# Patient Record
Sex: FEMALE | Race: ASIAN | NOT HISPANIC OR LATINO | URBAN - METROPOLITAN AREA
[De-identification: names, ages, dates, MRNs, and addresses within clinical notes are randomized per-mention and may not be internally consistent; named-entity substitution may affect disease eponyms.]

---

## 2022-11-04 VITALS
RESPIRATION RATE: 18 BRPM | HEART RATE: 81 BPM | DIASTOLIC BLOOD PRESSURE: 95 MMHG | SYSTOLIC BLOOD PRESSURE: 158 MMHG | OXYGEN SATURATION: 99 % | TEMPERATURE: 98 F

## 2022-11-04 PROCEDURE — 99285 EMERGENCY DEPT VISIT HI MDM: CPT

## 2022-11-04 PROCEDURE — 99053 MED SERV 10PM-8AM 24 HR FAC: CPT

## 2022-11-04 NOTE — ED ADULT TRIAGE NOTE - ARRIVAL INFO ADDITIONAL COMMENTS
Pt to ED c/o Rectal bleeding. Pt reports 2 episodes of bloody stool "Bright red and a lot of blood". Pt had colonoscope this Morning at 08:00. Pt reports removal of 3 polyps. Hx of Diverticulosis. Denies N/V, visual changes, nor dizziness.

## 2022-11-05 ENCOUNTER — INPATIENT (INPATIENT)
Facility: HOSPITAL | Age: 67
LOS: 1 days | Discharge: HOME | DRG: 920 | End: 2022-11-07
Attending: STUDENT IN AN ORGANIZED HEALTH CARE EDUCATION/TRAINING PROGRAM | Admitting: STUDENT IN AN ORGANIZED HEALTH CARE EDUCATION/TRAINING PROGRAM
Payer: COMMERCIAL

## 2022-11-05 LAB
ALBUMIN SERPL ELPH-MCNC: 4.5 G/DL — SIGNIFICANT CHANGE UP (ref 3.3–5)
ALP SERPL-CCNC: 81 U/L — SIGNIFICANT CHANGE UP (ref 40–120)
ALT FLD-CCNC: 11 U/L — SIGNIFICANT CHANGE UP (ref 10–45)
ANION GAP SERPL CALC-SCNC: 8 MMOL/L — SIGNIFICANT CHANGE UP (ref 5–17)
APTT BLD: 33.7 SEC — SIGNIFICANT CHANGE UP (ref 27.5–35.5)
AST SERPL-CCNC: 20 U/L — SIGNIFICANT CHANGE UP (ref 10–40)
BASOPHILS # BLD AUTO: 0.08 K/UL — SIGNIFICANT CHANGE UP (ref 0–0.2)
BASOPHILS NFR BLD AUTO: 1.5 % — SIGNIFICANT CHANGE UP (ref 0–2)
BILIRUB SERPL-MCNC: 0.2 MG/DL — SIGNIFICANT CHANGE UP (ref 0.2–1.2)
BLD GP AB SCN SERPL QL: NEGATIVE — SIGNIFICANT CHANGE UP
BLD GP AB SCN SERPL QL: NEGATIVE — SIGNIFICANT CHANGE UP
BUN SERPL-MCNC: 20 MG/DL — SIGNIFICANT CHANGE UP (ref 7–23)
CALCIUM SERPL-MCNC: 8.8 MG/DL — SIGNIFICANT CHANGE UP (ref 8.4–10.5)
CHLORIDE SERPL-SCNC: 104 MMOL/L — SIGNIFICANT CHANGE UP (ref 96–108)
CO2 SERPL-SCNC: 28 MMOL/L — SIGNIFICANT CHANGE UP (ref 22–31)
CREAT SERPL-MCNC: 0.86 MG/DL — SIGNIFICANT CHANGE UP (ref 0.5–1.3)
EGFR: 74 ML/MIN/1.73M2 — SIGNIFICANT CHANGE UP
EOSINOPHIL # BLD AUTO: 0.15 K/UL — SIGNIFICANT CHANGE UP (ref 0–0.5)
EOSINOPHIL NFR BLD AUTO: 2.7 % — SIGNIFICANT CHANGE UP (ref 0–6)
GLUCOSE BLDC GLUCOMTR-MCNC: 123 MG/DL — HIGH (ref 70–99)
GLUCOSE BLDC GLUCOMTR-MCNC: 140 MG/DL — HIGH (ref 70–99)
GLUCOSE SERPL-MCNC: 141 MG/DL — HIGH (ref 70–99)
HCT VFR BLD CALC: 21.6 % — LOW (ref 34.5–45)
HCT VFR BLD CALC: 26.1 % — LOW (ref 34.5–45)
HCT VFR BLD CALC: 26.8 % — LOW (ref 34.5–45)
HCT VFR BLD CALC: 32.1 % — LOW (ref 34.5–45)
HCT VFR BLD CALC: 39 % — SIGNIFICANT CHANGE UP (ref 34.5–45)
HGB BLD-MCNC: 10.2 G/DL — LOW (ref 11.5–15.5)
HGB BLD-MCNC: 12.7 G/DL — SIGNIFICANT CHANGE UP (ref 11.5–15.5)
HGB BLD-MCNC: 7.2 G/DL — LOW (ref 11.5–15.5)
HGB BLD-MCNC: 8.8 G/DL — LOW (ref 11.5–15.5)
HGB BLD-MCNC: 8.8 G/DL — LOW (ref 11.5–15.5)
IMM GRANULOCYTES NFR BLD AUTO: 0.2 % — SIGNIFICANT CHANGE UP (ref 0–0.9)
INR BLD: 0.99 — SIGNIFICANT CHANGE UP (ref 0.88–1.16)
LACTATE SERPL-SCNC: 1.1 MMOL/L — SIGNIFICANT CHANGE UP (ref 0.5–2)
LIDOCAIN IGE QN: 40 U/L — SIGNIFICANT CHANGE UP (ref 7–60)
LYMPHOCYTES # BLD AUTO: 1.57 K/UL — SIGNIFICANT CHANGE UP (ref 1–3.3)
LYMPHOCYTES # BLD AUTO: 28.5 % — SIGNIFICANT CHANGE UP (ref 13–44)
MAGNESIUM SERPL-MCNC: 2.2 MG/DL — SIGNIFICANT CHANGE UP (ref 1.6–2.6)
MCHC RBC-ENTMCNC: 30 PG — SIGNIFICANT CHANGE UP (ref 27–34)
MCHC RBC-ENTMCNC: 30.5 PG — SIGNIFICANT CHANGE UP (ref 27–34)
MCHC RBC-ENTMCNC: 30.5 PG — SIGNIFICANT CHANGE UP (ref 27–34)
MCHC RBC-ENTMCNC: 30.9 PG — SIGNIFICANT CHANGE UP (ref 27–34)
MCHC RBC-ENTMCNC: 31 PG — SIGNIFICANT CHANGE UP (ref 27–34)
MCHC RBC-ENTMCNC: 31.8 GM/DL — LOW (ref 32–36)
MCHC RBC-ENTMCNC: 32.6 GM/DL — SIGNIFICANT CHANGE UP (ref 32–36)
MCHC RBC-ENTMCNC: 32.8 GM/DL — SIGNIFICANT CHANGE UP (ref 32–36)
MCHC RBC-ENTMCNC: 33.3 GM/DL — SIGNIFICANT CHANGE UP (ref 32–36)
MCHC RBC-ENTMCNC: 33.7 GM/DL — SIGNIFICANT CHANGE UP (ref 32–36)
MCV RBC AUTO: 91.5 FL — SIGNIFICANT CHANGE UP (ref 80–100)
MCV RBC AUTO: 91.6 FL — SIGNIFICANT CHANGE UP (ref 80–100)
MCV RBC AUTO: 93.8 FL — SIGNIFICANT CHANGE UP (ref 80–100)
MCV RBC AUTO: 94.4 FL — SIGNIFICANT CHANGE UP (ref 80–100)
MCV RBC AUTO: 94.4 FL — SIGNIFICANT CHANGE UP (ref 80–100)
MONOCYTES # BLD AUTO: 0.41 K/UL — SIGNIFICANT CHANGE UP (ref 0–0.9)
MONOCYTES NFR BLD AUTO: 7.5 % — SIGNIFICANT CHANGE UP (ref 2–14)
NEUTROPHILS # BLD AUTO: 3.28 K/UL — SIGNIFICANT CHANGE UP (ref 1.8–7.4)
NEUTROPHILS NFR BLD AUTO: 59.6 % — SIGNIFICANT CHANGE UP (ref 43–77)
NRBC # BLD: 0 /100 WBCS — SIGNIFICANT CHANGE UP (ref 0–0)
PHOSPHATE SERPL-MCNC: 3.6 MG/DL — SIGNIFICANT CHANGE UP (ref 2.5–4.5)
PLATELET # BLD AUTO: 158 K/UL — SIGNIFICANT CHANGE UP (ref 150–400)
PLATELET # BLD AUTO: 174 K/UL — SIGNIFICANT CHANGE UP (ref 150–400)
PLATELET # BLD AUTO: 202 K/UL — SIGNIFICANT CHANGE UP (ref 150–400)
PLATELET # BLD AUTO: 203 K/UL — SIGNIFICANT CHANGE UP (ref 150–400)
PLATELET # BLD AUTO: 245 K/UL — SIGNIFICANT CHANGE UP (ref 150–400)
POTASSIUM SERPL-MCNC: 4.1 MMOL/L — SIGNIFICANT CHANGE UP (ref 3.5–5.3)
POTASSIUM SERPL-SCNC: 4.1 MMOL/L — SIGNIFICANT CHANGE UP (ref 3.5–5.3)
PROT SERPL-MCNC: 6.9 G/DL — SIGNIFICANT CHANGE UP (ref 6–8.3)
PROTHROM AB SERPL-ACNC: 11.8 SEC — SIGNIFICANT CHANGE UP (ref 10.5–13.4)
RBC # BLD: 2.36 M/UL — LOW (ref 3.8–5.2)
RBC # BLD: 2.84 M/UL — LOW (ref 3.8–5.2)
RBC # BLD: 2.85 M/UL — LOW (ref 3.8–5.2)
RBC # BLD: 3.4 M/UL — LOW (ref 3.8–5.2)
RBC # BLD: 4.16 M/UL — SIGNIFICANT CHANGE UP (ref 3.8–5.2)
RBC # FLD: 12.1 % — SIGNIFICANT CHANGE UP (ref 10.3–14.5)
RBC # FLD: 12.1 % — SIGNIFICANT CHANGE UP (ref 10.3–14.5)
RBC # FLD: 12.3 % — SIGNIFICANT CHANGE UP (ref 10.3–14.5)
RBC # FLD: 12.4 % — SIGNIFICANT CHANGE UP (ref 10.3–14.5)
RBC # FLD: 12.4 % — SIGNIFICANT CHANGE UP (ref 10.3–14.5)
RH IG SCN BLD-IMP: POSITIVE — SIGNIFICANT CHANGE UP
RH IG SCN BLD-IMP: POSITIVE — SIGNIFICANT CHANGE UP
SARS-COV-2 RNA SPEC QL NAA+PROBE: NEGATIVE — SIGNIFICANT CHANGE UP
SODIUM SERPL-SCNC: 140 MMOL/L — SIGNIFICANT CHANGE UP (ref 135–145)
WBC # BLD: 5.5 K/UL — SIGNIFICANT CHANGE UP (ref 3.8–10.5)
WBC # BLD: 5.76 K/UL — SIGNIFICANT CHANGE UP (ref 3.8–10.5)
WBC # BLD: 5.91 K/UL — SIGNIFICANT CHANGE UP (ref 3.8–10.5)
WBC # BLD: 6.55 K/UL — SIGNIFICANT CHANGE UP (ref 3.8–10.5)
WBC # BLD: 7.26 K/UL — SIGNIFICANT CHANGE UP (ref 3.8–10.5)
WBC # FLD AUTO: 5.5 K/UL — SIGNIFICANT CHANGE UP (ref 3.8–10.5)
WBC # FLD AUTO: 5.76 K/UL — SIGNIFICANT CHANGE UP (ref 3.8–10.5)
WBC # FLD AUTO: 5.91 K/UL — SIGNIFICANT CHANGE UP (ref 3.8–10.5)
WBC # FLD AUTO: 6.55 K/UL — SIGNIFICANT CHANGE UP (ref 3.8–10.5)
WBC # FLD AUTO: 7.26 K/UL — SIGNIFICANT CHANGE UP (ref 3.8–10.5)

## 2022-11-05 PROCEDURE — 99291 CRITICAL CARE FIRST HOUR: CPT | Mod: GC

## 2022-11-05 PROCEDURE — 99254 IP/OBS CNSLTJ NEW/EST MOD 60: CPT | Mod: 25

## 2022-11-05 PROCEDURE — 45382 COLONOSCOPY W/CONTROL BLEED: CPT

## 2022-11-05 PROCEDURE — 74174 CTA ABD&PLVS W/CONTRAST: CPT | Mod: 26,MA

## 2022-11-05 PROCEDURE — 99252 IP/OBS CONSLTJ NEW/EST SF 35: CPT | Mod: GC

## 2022-11-05 RX ORDER — DEXTROSE 50 % IN WATER 50 %
15 SYRINGE (ML) INTRAVENOUS ONCE
Refills: 0 | Status: DISCONTINUED | OUTPATIENT
Start: 2022-11-05 | End: 2022-11-06

## 2022-11-05 RX ORDER — PANTOPRAZOLE SODIUM 20 MG/1
40 TABLET, DELAYED RELEASE ORAL DAILY
Refills: 0 | Status: DISCONTINUED | OUTPATIENT
Start: 2022-11-05 | End: 2022-11-07

## 2022-11-05 RX ORDER — SODIUM CHLORIDE 9 MG/ML
1000 INJECTION, SOLUTION INTRAVENOUS
Refills: 0 | Status: DISCONTINUED | OUTPATIENT
Start: 2022-11-05 | End: 2022-11-06

## 2022-11-05 RX ORDER — DEXTROSE 50 % IN WATER 50 %
25 SYRINGE (ML) INTRAVENOUS ONCE
Refills: 0 | Status: DISCONTINUED | OUTPATIENT
Start: 2022-11-05 | End: 2022-11-06

## 2022-11-05 RX ORDER — DEXTROSE 50 % IN WATER 50 %
12.5 SYRINGE (ML) INTRAVENOUS ONCE
Refills: 0 | Status: DISCONTINUED | OUTPATIENT
Start: 2022-11-05 | End: 2022-11-06

## 2022-11-05 RX ORDER — INSULIN LISPRO 100/ML
VIAL (ML) SUBCUTANEOUS EVERY 6 HOURS
Refills: 0 | Status: DISCONTINUED | OUTPATIENT
Start: 2022-11-05 | End: 2022-11-06

## 2022-11-05 RX ORDER — SODIUM CHLORIDE 9 MG/ML
1000 INJECTION, SOLUTION INTRAVENOUS ONCE
Refills: 0 | Status: COMPLETED | OUTPATIENT
Start: 2022-11-05 | End: 2022-11-05

## 2022-11-05 RX ORDER — EPINEPHRINE 0.3 MG/.3ML
0.1 INJECTION INTRAMUSCULAR; SUBCUTANEOUS ONCE
Refills: 0 | Status: DISCONTINUED | OUTPATIENT
Start: 2022-11-05 | End: 2022-11-05

## 2022-11-05 RX ORDER — CHLORHEXIDINE GLUCONATE 213 G/1000ML
1 SOLUTION TOPICAL
Refills: 0 | Status: DISCONTINUED | OUTPATIENT
Start: 2022-11-05 | End: 2022-11-07

## 2022-11-05 RX ORDER — MIDAZOLAM HYDROCHLORIDE 1 MG/ML
4 INJECTION, SOLUTION INTRAMUSCULAR; INTRAVENOUS ONCE
Refills: 0 | Status: DISCONTINUED | OUTPATIENT
Start: 2022-11-05 | End: 2022-11-05

## 2022-11-05 RX ORDER — INFLUENZA VIRUS VACCINE 15; 15; 15; 15 UG/.5ML; UG/.5ML; UG/.5ML; UG/.5ML
0.7 SUSPENSION INTRAMUSCULAR ONCE
Refills: 0 | Status: DISCONTINUED | OUTPATIENT
Start: 2022-11-05 | End: 2022-11-07

## 2022-11-05 RX ORDER — SOD SULF/SODIUM/NAHCO3/KCL/PEG
2000 SOLUTION, RECONSTITUTED, ORAL ORAL ONCE
Refills: 0 | Status: COMPLETED | OUTPATIENT
Start: 2022-11-05 | End: 2022-11-05

## 2022-11-05 RX ORDER — FENTANYL CITRATE 50 UG/ML
100 INJECTION INTRAVENOUS ONCE
Refills: 0 | Status: DISCONTINUED | OUTPATIENT
Start: 2022-11-05 | End: 2022-11-05

## 2022-11-05 RX ORDER — GLUCAGON INJECTION, SOLUTION 0.5 MG/.1ML
1 INJECTION, SOLUTION SUBCUTANEOUS ONCE
Refills: 0 | Status: DISCONTINUED | OUTPATIENT
Start: 2022-11-05 | End: 2022-11-06

## 2022-11-05 RX ADMIN — SODIUM CHLORIDE 100 MILLILITER(S): 9 INJECTION, SOLUTION INTRAVENOUS at 09:14

## 2022-11-05 RX ADMIN — FENTANYL CITRATE 100 MICROGRAM(S): 50 INJECTION INTRAVENOUS at 14:15

## 2022-11-05 RX ADMIN — PANTOPRAZOLE SODIUM 40 MILLIGRAM(S): 20 TABLET, DELAYED RELEASE ORAL at 12:22

## 2022-11-05 RX ADMIN — SODIUM CHLORIDE 1000 MILLILITER(S): 9 INJECTION, SOLUTION INTRAVENOUS at 06:00

## 2022-11-05 RX ADMIN — FENTANYL CITRATE 100 MICROGRAM(S): 50 INJECTION INTRAVENOUS at 15:00

## 2022-11-05 RX ADMIN — MIDAZOLAM HYDROCHLORIDE 4 MILLIGRAM(S): 1 INJECTION, SOLUTION INTRAMUSCULAR; INTRAVENOUS at 14:40

## 2022-11-05 RX ADMIN — Medication 2000 MILLILITER(S): at 09:14

## 2022-11-05 RX ADMIN — SODIUM CHLORIDE 100 MILLILITER(S): 9 INJECTION, SOLUTION INTRAVENOUS at 17:14

## 2022-11-05 NOTE — H&P ADULT - HISTORY OF PRESENT ILLNESS
66yo female with no sig PMH/PSH POD0 from screning colonoscopy with 3 popypectomies who presents with 4 hour history of BRBPR. Patient underwent colonoscopy at [facility] this morning at 9am where 3 polyps were removed (2 cecal, 1 decending), following procedure went home to sleep. At 2230 had 3-4 episodes of BRBPR, described as mixed with stool, however in pictures provided no stool identified. Blood no associated with any abdominal pain, rectal pain. Denies dizziness, chest pain, dyspnea, nausea, vomiting. Prior to this previous colonoscopy was "many years ago."    PMH: None  PSH: none  Meds: none  All: none  Social: non smoker, occasional etoh  Fam: no personal or family hx of IBD or CRC  Functional capacity: >4METS    In the ED:  -afebrile, non tachycardic, not hypotensive  - Orthostatic VS  Lying BP: 137/85 HR: 76  Sitting BP: 151/87 HR: 82  Standing BP: 121/89 HR: 97  - Hgb 10.2 from 12.7  - CTA prelim read with active extravasation from cecum

## 2022-11-05 NOTE — ED ADULT NURSE NOTE - OBJECTIVE STATEMENT
Pt arrived to ED AAOX4, spont unlab breathing on RA, NAD. PT had colonoscopy early this AM and is now experiencing rectal bleeding. Pt had 3 BM since 2200 and reports large amount of bright red blood with some clots. Pt denies abd pain but states it feels like her abd is gurgling. Pt denies fevers. Had 3 polyps removed during colonoscopy. Denies lightheadedness, SOB, CP or dizziness. Will continue to assess.

## 2022-11-05 NOTE — ED PROVIDER NOTE - NS ED ROS FT
Constitutional: No fever or chills.   Eyes: No pain, blurry vision, or discharge.  ENMT: No hearing changes, pain, discharge or infections. No neck pain or stiffness.  Cardiac: No chest pain, SOB or edema. No chest pain with exertion.  Respiratory: No cough or respiratory distress. No hemoptysis. No history of asthma or RAD.  GI: No nausea, vomiting, diarrhea. See HPI  : No dysuria, frequency or burning.  MS: No myalgia, muscle weakness, joint pain or back pain.  Neuro: No headache or weakness. No LOC.  Skin: No skin rash.   Endocrine: No history of thyroid disease or diabetes.  Except as documented in the HPI, all other systems are negative.

## 2022-11-05 NOTE — ED PROVIDER NOTE - OBJECTIVE STATEMENT
Pt w/ no sig PMHx, no PSHx p/w BRBPR since 10pm tonight. She states she had screening c-scope at 8 am this morning, returned home at 10am. She states she had polyps removed. She presents the report with the following impressions: "Benign neoplasm of cecum, benign neoplasm of desc colon, small internal hemorrhoids, and pandiverticulosis." At 10pm, she felt the need to have a BM. She went to the bathroom and passed red blood into the toilet w/ small clots. She has since had 2 subsequent episodes w/o BMs. No abd pain. No rectal pain. She has never had EGD. Last c-scope 17 years ago, wnl. No AC use. No antiplatelet use.

## 2022-11-05 NOTE — CHART NOTE - NSCHARTNOTEFT_GEN_A_CORE
Colonoscopy performed in ICU at bedside      Impressions:  The site of prior cecal polypectomy was identified - appeared ulcerated and with blood clot attached. The site was not actively bleeding, but was likely the source of her bleeding. Two hemoclips were placed with good approximation of tissue. Post clip observation did not reveal active bleeding, and the ulcerated region was no longer visible. .    Diverticulosis of the whole colon.    Plan:	  Return to floor for further management  Clear Liquid Diet  Trend HgB    Prabhjot Garza M.D.  Gastroenterology Fellow  Weekday Pager: [phone]  Weeknights/Weekend Coverage: Please Call the  for contact info

## 2022-11-05 NOTE — H&P ADULT - NSHPPHYSICALEXAM_GEN_ALL_CORE
Physical Exam:  General: NAD, resting comfortably in bed  Pulmonary: Nonlabored, no respiratory distress  Cardiovascular: regular rate and rhythm   Abdominal: soft, mild LLQ tenderness no rebound  TAYLOR (chaperoned by female RN: blood in vault, good rectal tone, no massess  Extremities: WWP, normal strength  Neuro: A/O x 3, no focal deficits, normal motor/sensation  Pulses: palpable distal pulses

## 2022-11-05 NOTE — CONSULT NOTE ADULT - ATTENDING COMMENTS
67yoF, underwent screening colonoscopy with 2 polyps removed from the cecum and 1 from the descending colon 11/4/22, presented with BRBPR  CTA pos for active extravasation in the cecum.   Suspect postpolypectomy bleeding.       Agree with colonoscopy with bleeding control today at ICU bedside.
68 yo F admitted for GIB after recent 3 polypectomies done on screening colonoscopy. CTa with active extravasation in the cecum. plan for bedside colonoscopy today. c/w ppi, monitor hgb and transfuse as needed.

## 2022-11-05 NOTE — PROCEDURAL SAFETY CHECKLIST WITH OR WITHOUT SEDATION - NSDXIMAGING_GEN_ALL_CORE
not applicable
[Breast Pain] : no breast pain
[Nipple Discharge] : no nipple discharge
[Breast Lump] : no breast lump
done
[Negative] : Constitutional
[Nipple Inverted] : no inversion of the nipple

## 2022-11-05 NOTE — CONSULT NOTE ADULT - SUBJECTIVE AND OBJECTIVE BOX
GASTROENTEROLOGY CONSULT NOTE  HPI:  66 yo F, underwent screening colonoscopy with 3 polypectomies 11/4/22, pw BRBPR,   3 polyps were removed (2 cecal, 1 descending) unclear what method - I called outpt GI without success.  Patient ate dinner, went to sleep, woke up to 3-4 episodes of painless BRBPR,   Prior colon was 17 years ago, reported "clean"  Has Fhx of CRC  Recall was 3 years on patient dc instructions shown to me  Patient finishing 1.5L of prep when I was in room, still passing blood per rectum    Allergies    Allergy Status Unknown    Intolerances      Home Medications:    MEDICATIONS:  MEDICATIONS  (STANDING):  chlorhexidine 4% Liquid 1 Application(s) Topical <User Schedule>  dextrose 5%. 1000 milliLiter(s) (50 mL/Hr) IV Continuous <Continuous>  dextrose 5%. 1000 milliLiter(s) (100 mL/Hr) IV Continuous <Continuous>  dextrose 50% Injectable 25 Gram(s) IV Push once  dextrose 50% Injectable 12.5 Gram(s) IV Push once  dextrose 50% Injectable 25 Gram(s) IV Push once  glucagon  Injectable 1 milliGRAM(s) IntraMuscular once  influenza  Vaccine (HIGH DOSE) 0.7 milliLiter(s) IntraMuscular once  insulin lispro (ADMELOG) corrective regimen sliding scale   SubCutaneous every 6 hours  lactated ringers. 1000 milliLiter(s) (100 mL/Hr) IV Continuous <Continuous>  pantoprazole  Injectable 40 milliGRAM(s) IV Push daily    MEDICATIONS  (PRN):  dextrose Oral Gel 15 Gram(s) Oral once PRN Blood Glucose LESS THAN 70 milliGRAM(s)/deciliter    PAST MEDICAL & SURGICAL HISTORY:  No pertinent past medical history  denies ac or antiplts        FAMILY HISTORY:  CRC      SOCIAL HISTORY:  Tobacco: denies  Alcohol: eduardo  Illicit Drugs: denies    REVIEW OF SYSTEMS:  All other 10 review of systems is negative unless indicated above.    Vital Signs Last 24 Hrs  T(C): 36.3 (05 Nov 2022 09:23), Max: 36.8 (04 Nov 2022 23:05)  T(F): 97.4 (05 Nov 2022 09:23), Max: 98.2 (04 Nov 2022 23:05)  HR: 85 (05 Nov 2022 11:00) (79 - 103)  BP: 102/67 (05 Nov 2022 11:00) (99/63 - 158/95)  BP(mean): 80 (05 Nov 2022 11:00) (75 - 80)  RR: 13 (05 Nov 2022 11:00) (12 - 28)  SpO2: 100% (05 Nov 2022 11:00) (97% - 100%)    Parameters below as of 05 Nov 2022 11:00  Patient On (Oxygen Delivery Method): room air        11-05 @ 08:01  -  11-05 @ 11:47  --------------------------------------------------------  IN: 2300 mL / OUT: 0 mL / NET: 2300 mL        PHYSICAL EXAM:    General: lying in bed, in no acute distress  HEENT: Neck supple, mmm, no jvd  Lungs: Normal respiratory effort, no intercostal retractions  Cardiovascular: regular rate  Abdomen: Soft, non-tender non-distended; No rebound or guarding  Extremities: wwp, no cce  Neurological: THURMAN, speech fluent  Skin: Warm and dry. No obvious rash    LABS:                        8.8    6.55  )-----------( 202      ( 05 Nov 2022 10:20 )             26.8     11-05    140  |  104  |  20  ----------------------------<  141<H>  4.1   |  28  |  0.86    Ca    8.8      05 Nov 2022 00:35  Phos  3.6     11-05  Mg     2.2     11-05    TPro  6.9  /  Alb  4.5  /  TBili  0.2  /  DBili  x   /  AST  20  /  ALT  11  /  AlkPhos  81  11-05        PT/INR - ( 05 Nov 2022 00:35 )   PT: 11.8 sec;   INR: 0.99          PTT - ( 05 Nov 2022 00:35 )  PTT:33.7 sec    RADIOLOGY & ADDITIONAL STUDIES:     Reviewed

## 2022-11-05 NOTE — PROCEDURAL SAFETY CHECKLIST WITH OR WITHOUT SEDATION - NSSPECIALEQUIPSUPPLY_GEN_ALL_CORE
Impression: Macular degeneration, wet OS. Status: Symptomatic.
s/p Lucentis x 37  last 07/06/2021
s/p Avastin x 27  last 02/16/2021 Plan: The patient notes metamorphopsia. Exam and OCT reveal scant IRF and a PED OS. An IVFA from 08/31/2021 demonstrated leakage. Fundus Photos from 08/31/2021 demonstrated drusen. Recommend Lucentis R/B/A discussed with patient, and the patient elects to proceed with Lucentis (bilateral). Discussed AREDS / I Vit and Amsler grid.
done
done

## 2022-11-05 NOTE — ED PROVIDER NOTE - CLINICAL SUMMARY MEDICAL DECISION MAKING FREE TEXT BOX
Pt p/w lower GI bleeding s/p c-scope, likely from bx site. HDS. No heavy bleeding on exam. Orthostatics wnl. Will monitor in ED and get serial H/H. Consider CTA if status changes. Dispo pending w/u and clinical status

## 2022-11-05 NOTE — PATIENT PROFILE ADULT - FALL HARM RISK - RISK INTERVENTIONS

## 2022-11-05 NOTE — ED PROVIDER NOTE - PROGRESS NOTE DETAILS
Sig drop in H/H. VS wnl. Pt reports 2 further episodes. Repeat rectal exam, no active bleeding from rectum, but will red blood in rectum. Will get CTA bleeding scan. Surgery consulted and will see the pt + active bleeding visualized at cecum polypectomy site, reviewed by surgery, d/w rads. Admit to [facility]. Pt likely for c-scope or IR procedure this mornign + active bleeding visualized at cecum polypectomy site, reviewed by surgery, d/w rads. Admit to [facility]. Pt likely for c-scope or IR procedure this morning

## 2022-11-05 NOTE — CONSULT NOTE ADULT - ASSESSMENT
67F with no significant pmh or psh who presents to [facility] following screening colonoscopy w/ polypectomy x3 with 4 hr h/o BRBPR. Passed 3-4 episodes of hematochezia otherwise denies any associated symptoms including abdominal pain, n/v, lightheadedness, changes in vision, palpitations, or rectal pain. In the ED vitals were orthostatic (as written above), otherwise nontacchycardic or hypotensive. Hgb 10.2, Cr. 0.86, lactate 1.1. CTA A/P notable for active extravasation from cecum. SICU consulted for hemodynamic monitoring with anticipated cscope with GI.     Neuro: AOx4. Pain control PRN  CV: MAP>65, trend H/H  Pulm: Sats well on RA  GI/FEN: LGIB- 2/2 cecal polypectomy. NPO, IVF, PPI. GI consulted- tentative cscope today.   : voids  Endo: mISS. BG goal <180  Heme: ABLA 2/2 LGIB. q6h CBC- 12.7>10.2.   PPX: SCD/Hold SQH in setting of LGIB  PT/OT: Not Ordered  Dispo: SICU  
68 yo F, underwent screening colonoscopy with 3 polypectomies 11/4/22, pw BRBPR,     GI was consulted for GI bleeding    #BRBPR  #Acute blood loss anemia, in setting of recent polypectomy    Recommendations:  Keep NPO post prep  Bedside Colonoscopy today  Trend HgB    Thank you for the courtesy of this consult. We will follow along with you.    [facility]OM[facility] ED, Surgical Team and Attending, GI Attending, RN    Prabhjot Garza M.D.  Gastroenterology Fellow  Weekday Pager: [phone]  Weeknights/Weekend Coverage: Please Call the  for contact info

## 2022-11-05 NOTE — ED PROVIDER NOTE - PHYSICAL EXAMINATION
Constitutional: Well appearing, awake, alert, oriented to person, place, time/situation and in no apparent distress.  ENMT: Airway patent. Normal MM  Eyes: Clear bilaterally. no conjunctival pallor  Cardiac: Normal rate, regular rhythm.  Heart sounds S1, S2.  No murmurs, rubs or gallops.  Respiratory: Breaths sounds equal and clear b/l. No increased WOB, tachypnea, hypoxia, or accessory mm use. Pt speaks in full sentences.   Gastrointestinal: Abd soft, NT, ND, NABS. No guarding, rebound, or rigidity. No pulsatile abdominal masses.   Rectal: no active bleeding. + dark red blood in rectum   Musculoskeletal: Range of motion is not limited  Neuro: Alert and oriented x 3, face symmetric and speech fluent. Strength 5/5 x 4 ext and symmetric, nml gross motor movement, nml gait. No focal deficits noted.  Skin: Skin normal color for race, warm, dry and intact. No evidence of rash.  Psych: Alert and oriented to person, place, time/situation. normal mood and affect. no apparent risk to self or others.

## 2022-11-05 NOTE — H&P ADULT - ASSESSMENT
68yo female post procedure day 1 from screening colonoscopy with cecal and descending polypectomy presents with post procedural BRBPR. Afebrile, HDS, exam sig for LLQ tenderness and mild distention, Hgb downtrending while in the emergency department from 12.7 to 10.2. CTA prelim read demonstrating likely contrast extravasation in cecum, Impression is post polypectomy bleeding. Admit to SICU for HD monitoring      Plan:   Neuro: Pain control PRN   CV: MAP>65, trend H/H  Pulm: Sats well on RA  GI/FEN: NPO, IVF, PPI  : voids  Endo: mISS  Heme: CBCq2  PPX: SCD/Hold SQH  PT/OT: Not Ordered  Dispo: SICU

## 2022-11-06 LAB
A1C WITH ESTIMATED AVERAGE GLUCOSE RESULT: 5.7 % — HIGH (ref 4–5.6)
ANION GAP SERPL CALC-SCNC: 4 MMOL/L — LOW (ref 5–17)
BUN SERPL-MCNC: 9 MG/DL — SIGNIFICANT CHANGE UP (ref 7–23)
CALCIUM SERPL-MCNC: 7.8 MG/DL — LOW (ref 8.4–10.5)
CHLORIDE SERPL-SCNC: 112 MMOL/L — HIGH (ref 96–108)
CO2 SERPL-SCNC: 28 MMOL/L — SIGNIFICANT CHANGE UP (ref 22–31)
CREAT SERPL-MCNC: 0.71 MG/DL — SIGNIFICANT CHANGE UP (ref 0.5–1.3)
EGFR: 93 ML/MIN/1.73M2 — SIGNIFICANT CHANGE UP
ESTIMATED AVERAGE GLUCOSE: 117 MG/DL — HIGH (ref 68–114)
GLUCOSE BLDC GLUCOMTR-MCNC: 120 MG/DL — HIGH (ref 70–99)
GLUCOSE BLDC GLUCOMTR-MCNC: 122 MG/DL — HIGH (ref 70–99)
GLUCOSE BLDC GLUCOMTR-MCNC: 127 MG/DL — HIGH (ref 70–99)
GLUCOSE BLDC GLUCOMTR-MCNC: 88 MG/DL — SIGNIFICANT CHANGE UP (ref 70–99)
GLUCOSE SERPL-MCNC: 115 MG/DL — HIGH (ref 70–99)
HCT VFR BLD CALC: 22.4 % — LOW (ref 34.5–45)
HCT VFR BLD CALC: 22.5 % — LOW (ref 34.5–45)
HCT VFR BLD CALC: 25.2 % — LOW (ref 34.5–45)
HCV AB S/CO SERPL IA: 0.04 S/CO — SIGNIFICANT CHANGE UP
HCV AB SERPL-IMP: SIGNIFICANT CHANGE UP
HGB BLD-MCNC: 7.5 G/DL — LOW (ref 11.5–15.5)
HGB BLD-MCNC: 7.6 G/DL — LOW (ref 11.5–15.5)
HGB BLD-MCNC: 8.2 G/DL — LOW (ref 11.5–15.5)
MAGNESIUM SERPL-MCNC: 1.9 MG/DL — SIGNIFICANT CHANGE UP (ref 1.6–2.6)
MCHC RBC-ENTMCNC: 30.4 PG — SIGNIFICANT CHANGE UP (ref 27–34)
MCHC RBC-ENTMCNC: 31 PG — SIGNIFICANT CHANGE UP (ref 27–34)
MCHC RBC-ENTMCNC: 31.3 PG — SIGNIFICANT CHANGE UP (ref 27–34)
MCHC RBC-ENTMCNC: 32.5 GM/DL — SIGNIFICANT CHANGE UP (ref 32–36)
MCHC RBC-ENTMCNC: 33.3 GM/DL — SIGNIFICANT CHANGE UP (ref 32–36)
MCHC RBC-ENTMCNC: 33.9 GM/DL — SIGNIFICANT CHANGE UP (ref 32–36)
MCV RBC AUTO: 92.2 FL — SIGNIFICANT CHANGE UP (ref 80–100)
MCV RBC AUTO: 93 FL — SIGNIFICANT CHANGE UP (ref 80–100)
MCV RBC AUTO: 93.3 FL — SIGNIFICANT CHANGE UP (ref 80–100)
NRBC # BLD: 0 /100 WBCS — SIGNIFICANT CHANGE UP (ref 0–0)
PHOSPHATE SERPL-MCNC: 2.7 MG/DL — SIGNIFICANT CHANGE UP (ref 2.5–4.5)
PLATELET # BLD AUTO: 148 K/UL — LOW (ref 150–400)
PLATELET # BLD AUTO: 148 K/UL — LOW (ref 150–400)
PLATELET # BLD AUTO: 163 K/UL — SIGNIFICANT CHANGE UP (ref 150–400)
POTASSIUM SERPL-MCNC: 3.8 MMOL/L — SIGNIFICANT CHANGE UP (ref 3.5–5.3)
POTASSIUM SERPL-SCNC: 3.8 MMOL/L — SIGNIFICANT CHANGE UP (ref 3.5–5.3)
RBC # BLD: 2.42 M/UL — LOW (ref 3.8–5.2)
RBC # BLD: 2.43 M/UL — LOW (ref 3.8–5.2)
RBC # BLD: 2.7 M/UL — LOW (ref 3.8–5.2)
RBC # FLD: 12.7 % — SIGNIFICANT CHANGE UP (ref 10.3–14.5)
RBC # FLD: 13.2 % — SIGNIFICANT CHANGE UP (ref 10.3–14.5)
RBC # FLD: 13.2 % — SIGNIFICANT CHANGE UP (ref 10.3–14.5)
SODIUM SERPL-SCNC: 144 MMOL/L — SIGNIFICANT CHANGE UP (ref 135–145)
WBC # BLD: 5.15 K/UL — SIGNIFICANT CHANGE UP (ref 3.8–10.5)
WBC # BLD: 6.5 K/UL — SIGNIFICANT CHANGE UP (ref 3.8–10.5)
WBC # BLD: 7.78 K/UL — SIGNIFICANT CHANGE UP (ref 3.8–10.5)
WBC # FLD AUTO: 5.15 K/UL — SIGNIFICANT CHANGE UP (ref 3.8–10.5)
WBC # FLD AUTO: 6.5 K/UL — SIGNIFICANT CHANGE UP (ref 3.8–10.5)
WBC # FLD AUTO: 7.78 K/UL — SIGNIFICANT CHANGE UP (ref 3.8–10.5)

## 2022-11-06 PROCEDURE — 99233 SBSQ HOSP IP/OBS HIGH 50: CPT | Mod: GC

## 2022-11-06 PROCEDURE — 99232 SBSQ HOSP IP/OBS MODERATE 35: CPT | Mod: GC

## 2022-11-06 PROCEDURE — 99232 SBSQ HOSP IP/OBS MODERATE 35: CPT

## 2022-11-06 RX ORDER — POTASSIUM PHOSPHATE, MONOBASIC POTASSIUM PHOSPHATE, DIBASIC 236; 224 MG/ML; MG/ML
15 INJECTION, SOLUTION INTRAVENOUS ONCE
Refills: 0 | Status: COMPLETED | OUTPATIENT
Start: 2022-11-06 | End: 2022-11-06

## 2022-11-06 RX ORDER — SODIUM CHLORIDE 9 MG/ML
3 INJECTION INTRAMUSCULAR; INTRAVENOUS; SUBCUTANEOUS EVERY 8 HOURS
Refills: 0 | Status: DISCONTINUED | OUTPATIENT
Start: 2022-11-06 | End: 2022-11-07

## 2022-11-06 RX ADMIN — PANTOPRAZOLE SODIUM 40 MILLIGRAM(S): 20 TABLET, DELAYED RELEASE ORAL at 13:11

## 2022-11-06 RX ADMIN — POTASSIUM PHOSPHATE, MONOBASIC POTASSIUM PHOSPHATE, DIBASIC 62.5 MILLIMOLE(S): 236; 224 INJECTION, SOLUTION INTRAVENOUS at 09:36

## 2022-11-06 RX ADMIN — SODIUM CHLORIDE 100 MILLILITER(S): 9 INJECTION, SOLUTION INTRAVENOUS at 11:51

## 2022-11-06 RX ADMIN — SODIUM CHLORIDE 3 MILLILITER(S): 9 INJECTION INTRAMUSCULAR; INTRAVENOUS; SUBCUTANEOUS at 21:42

## 2022-11-06 RX ADMIN — CHLORHEXIDINE GLUCONATE 1 APPLICATION(S): 213 SOLUTION TOPICAL at 06:16

## 2022-11-06 NOTE — PROGRESS NOTE ADULT - ATTENDING COMMENTS
Agree with the assessment and plan documented in fellow's note.
admitted for gib. s/p colonoscopy yesterday which showed Diverticulosis, cecal polypectomy site with ulcer / blood clot, hemo-clipped x2. tolerating clear liquid this am, hd stable. transfer to floor.

## 2022-11-06 NOTE — PROGRESS NOTE ADULT - ASSESSMENT
68 yo F, underwent screening colonoscopy with 3 polypectomies 11/4/22, pw BRBPR,     GI was consulted for GI bleeding    Colonoscopy 11/5/2022: Diverticulosis, cecal polypectomy site with ulcer / blood clot, hemo-clipped x2    #BRBPR  #Acute blood loss anemia, in setting of recent polypectomy    Recommendations:  Advance Diet Today  Trend HgB  Consider IV Iron    Thank you for the courtesy of this consult. We will follow along with you.    Prabhjot Garza M.D.  Gastroenterology Fellow  Weekday Pager: [phone]  Weeknights/Weekend Coverage: Please Call the  for contact info 66 yo F, underwent screening colonoscopy with 3 polypectomies 11/4/22, pw BRBPR,     GI was consulted for GI bleeding    Colonoscopy 11/5/2022: Diverticulosis, cecal polypectomy site with ulcer / blood clot, hemo-clipped x2    #BRBPR  #Acute blood loss anemia, in setting of recent polypectomy    Recommendations:  Advance Diet Today  Trend HgB  Consider IV Iron  I discussed her care with her outpatient GI office covering physician    Thank you for the courtesy of this consult. We will follow along with you.    Prabhjot Garza M.D.  Gastroenterology Fellow  Weekday Pager: [phone]  Weeknights/Weekend Coverage: Please Call the  for contact info

## 2022-11-06 NOTE — PROGRESS NOTE ADULT - ASSESSMENT
68yo female post procedure day 1 from screening colonoscopy with cecal and descending polypectomy presents with post procedural BRBPR. Afebrile, HDS, exam sig for LLQ tenderness and mild distention, Hgb downtrending while in the emergency department from 12.7 to 10.2. CTA prelim read demonstrating likely contrast extravasation in cecum, likely polypectomy bleeding. s/p Cscope w no bleed but adherent clot and clipping x 2 (11/5).     Plan:   Neuro: Pain control PRN   CV: MAP>65, trend H/H  Pulm: Sats well on RA  GI/FEN:  Advanced to FLD, , PPI--recommend D/C  : voids  Endo: mISS  Heme: Monitor Hg. next CBC at 2pm  PPX: SCD/Hold SQH  T/L/D: PIV x2. Removed Meagan daly (11/5-6)  PT/OT: As tolerated  Dispo: SDU

## 2022-11-06 NOTE — PROGRESS NOTE ADULT - ASSESSMENT
PLAN PENDING ATTENDING  66yo female post procedure day 1 from screening colonoscopy with cecal and descending polypectomy presents with post procedural BRBPR. Afebrile, HDS, exam sig for LLQ tenderness and mild distention, Hgb downtrending while in the emergency department from 12.7 to 10.2. CTA prelim read demonstrating likely contrast extravasation in cecum, Impression is post polypectomy bleeding. Admit to SICU for HD monitoring      Plan:   -Pain/nausea control  -PPI  -Care per SICU   66yo female post procedure day 1 from screening colonoscopy with cecal and descending polypectomy presents with post procedural BRBPR. Afebrile, HDS, exam sig for LLQ tenderness and mild distention, Hgb downtrending while in the emergency department from 12.7 to 10.2. CTA prelim read demonstrating likely contrast extravasation in cecum, Impression is post polypectomy bleeding. Admit to SICU for HD monitoring      Plan:   -SDU  -FLD  -Pain/nausea control  -PPI  -Care per SICU

## 2022-11-07 VITALS — TEMPERATURE: 99 F

## 2022-11-07 LAB
ANION GAP SERPL CALC-SCNC: 8 MMOL/L — SIGNIFICANT CHANGE UP (ref 5–17)
BUN SERPL-MCNC: 4 MG/DL — LOW (ref 7–23)
CALCIUM SERPL-MCNC: 8.4 MG/DL — SIGNIFICANT CHANGE UP (ref 8.4–10.5)
CHLORIDE SERPL-SCNC: 108 MMOL/L — SIGNIFICANT CHANGE UP (ref 96–108)
CO2 SERPL-SCNC: 27 MMOL/L — SIGNIFICANT CHANGE UP (ref 22–31)
CREAT SERPL-MCNC: 0.73 MG/DL — SIGNIFICANT CHANGE UP (ref 0.5–1.3)
EGFR: 90 ML/MIN/1.73M2 — SIGNIFICANT CHANGE UP
GLUCOSE SERPL-MCNC: 129 MG/DL — HIGH (ref 70–99)
HCT VFR BLD CALC: 23.2 % — LOW (ref 34.5–45)
HGB BLD-MCNC: 7.6 G/DL — LOW (ref 11.5–15.5)
MAGNESIUM SERPL-MCNC: 1.9 MG/DL — SIGNIFICANT CHANGE UP (ref 1.6–2.6)
MCHC RBC-ENTMCNC: 30.4 PG — SIGNIFICANT CHANGE UP (ref 27–34)
MCHC RBC-ENTMCNC: 32.8 GM/DL — SIGNIFICANT CHANGE UP (ref 32–36)
MCV RBC AUTO: 92.8 FL — SIGNIFICANT CHANGE UP (ref 80–100)
NRBC # BLD: 0 /100 WBCS — SIGNIFICANT CHANGE UP (ref 0–0)
PHOSPHATE SERPL-MCNC: 3.4 MG/DL — SIGNIFICANT CHANGE UP (ref 2.5–4.5)
PLATELET # BLD AUTO: 151 K/UL — SIGNIFICANT CHANGE UP (ref 150–400)
POTASSIUM SERPL-MCNC: 3.6 MMOL/L — SIGNIFICANT CHANGE UP (ref 3.5–5.3)
POTASSIUM SERPL-SCNC: 3.6 MMOL/L — SIGNIFICANT CHANGE UP (ref 3.5–5.3)
RBC # BLD: 2.5 M/UL — LOW (ref 3.8–5.2)
RBC # FLD: 12.9 % — SIGNIFICANT CHANGE UP (ref 10.3–14.5)
SODIUM SERPL-SCNC: 143 MMOL/L — SIGNIFICANT CHANGE UP (ref 135–145)
WBC # BLD: 5.98 K/UL — SIGNIFICANT CHANGE UP (ref 3.8–10.5)
WBC # FLD AUTO: 5.98 K/UL — SIGNIFICANT CHANGE UP (ref 3.8–10.5)

## 2022-11-07 PROCEDURE — 85025 COMPLETE CBC W/AUTO DIFF WBC: CPT

## 2022-11-07 PROCEDURE — 83605 ASSAY OF LACTIC ACID: CPT

## 2022-11-07 PROCEDURE — 86900 BLOOD TYPING SEROLOGIC ABO: CPT

## 2022-11-07 PROCEDURE — 87635 SARS-COV-2 COVID-19 AMP PRB: CPT

## 2022-11-07 PROCEDURE — 83036 HEMOGLOBIN GLYCOSYLATED A1C: CPT

## 2022-11-07 PROCEDURE — 80053 COMPREHEN METABOLIC PANEL: CPT

## 2022-11-07 PROCEDURE — P9016: CPT

## 2022-11-07 PROCEDURE — 83735 ASSAY OF MAGNESIUM: CPT

## 2022-11-07 PROCEDURE — 82962 GLUCOSE BLOOD TEST: CPT

## 2022-11-07 PROCEDURE — 86923 COMPATIBILITY TEST ELECTRIC: CPT

## 2022-11-07 PROCEDURE — 99285 EMERGENCY DEPT VISIT HI MDM: CPT

## 2022-11-07 PROCEDURE — 99232 SBSQ HOSP IP/OBS MODERATE 35: CPT | Mod: GC

## 2022-11-07 PROCEDURE — 74174 CTA ABD&PLVS W/CONTRAST: CPT | Mod: MA

## 2022-11-07 PROCEDURE — 83690 ASSAY OF LIPASE: CPT

## 2022-11-07 PROCEDURE — 86803 HEPATITIS C AB TEST: CPT

## 2022-11-07 PROCEDURE — 80048 BASIC METABOLIC PNL TOTAL CA: CPT

## 2022-11-07 PROCEDURE — 36415 COLL VENOUS BLD VENIPUNCTURE: CPT

## 2022-11-07 PROCEDURE — 85610 PROTHROMBIN TIME: CPT

## 2022-11-07 PROCEDURE — 36430 TRANSFUSION BLD/BLD COMPNT: CPT

## 2022-11-07 PROCEDURE — 85730 THROMBOPLASTIN TIME PARTIAL: CPT

## 2022-11-07 PROCEDURE — 86901 BLOOD TYPING SEROLOGIC RH(D): CPT

## 2022-11-07 PROCEDURE — 84100 ASSAY OF PHOSPHORUS: CPT

## 2022-11-07 PROCEDURE — 86850 RBC ANTIBODY SCREEN: CPT

## 2022-11-07 PROCEDURE — 85027 COMPLETE CBC AUTOMATED: CPT

## 2022-11-07 RX ORDER — MAGNESIUM SULFATE 500 MG/ML
1 VIAL (ML) INJECTION ONCE
Refills: 0 | Status: COMPLETED | OUTPATIENT
Start: 2022-11-07 | End: 2022-11-07

## 2022-11-07 RX ORDER — PSYLLIUM SEED (WITH DEXTROSE)
1 POWDER (GRAM) ORAL
Qty: 30 | Refills: 0
Start: 2022-11-07 | End: 2022-12-06

## 2022-11-07 RX ORDER — PANTOPRAZOLE SODIUM 20 MG/1
1 TABLET, DELAYED RELEASE ORAL
Qty: 30 | Refills: 0
Start: 2022-11-07 | End: 2022-12-06

## 2022-11-07 RX ORDER — POTASSIUM CHLORIDE 20 MEQ
40 PACKET (EA) ORAL ONCE
Refills: 0 | Status: COMPLETED | OUTPATIENT
Start: 2022-11-07 | End: 2022-11-07

## 2022-11-07 RX ORDER — PSYLLIUM SEED (WITH DEXTROSE)
1 POWDER (GRAM) ORAL DAILY
Refills: 0 | Status: DISCONTINUED | OUTPATIENT
Start: 2022-11-07 | End: 2022-11-07

## 2022-11-07 RX ADMIN — SODIUM CHLORIDE 3 MILLILITER(S): 9 INJECTION INTRAMUSCULAR; INTRAVENOUS; SUBCUTANEOUS at 05:49

## 2022-11-07 NOTE — DISCHARGE NOTE PROVIDER - NSDCCPCAREPLAN_GEN_ALL_CORE_FT
PRINCIPAL DISCHARGE DIAGNOSIS  Diagnosis: Lower gastrointestinal bleeding  Assessment and Plan of Treatment: s/p colonoscopy

## 2022-11-07 NOTE — PROGRESS NOTE ADULT - ASSESSMENT
67 F s/p cscope with clipping x 2 for BRBPR s/p cecal and descending polypectomy, now on tele.     Full Liquid Diet  Pain/Nausea control PRN  Trend H&h  mISS  SCDs/IS/OOBA  Dispo: Home

## 2022-11-07 NOTE — DISCHARGE NOTE PROVIDER - HOSPITAL COURSE
68yo female with no sig PMH/PSH POD0 from screning colonoscopy with 3 popypectomies who presented on day of admission with 4 hour history of BRBPR. Patient underwent colonoscopy at [facility] the morning of admission at 9am where 3 polyps were removed (2 cecal, 1 decending), following procedure went home to sleep. At 2230 had 3-4 episodes of BRBPR, described as mixed with stool, however in pictures provided no stool identified. Blood no associated with any abdominal pain, rectal pain. Denied dizziness, chest pain, dyspnea, nausea, vomiting. Prior to this previous colonoscopy was "many years ago." Patient was admitted for further management and monitoring; the day of admission patient was given golytely for bowel prep and a CTA was obtained showed a blush at the cecum consistent with active bleeding. Patient was taken for colonoscopy and bleeding areas were clipped and given a unit of pRBCs. Her post procedure course was unremarkable with advancement of diet, passing trial of void, and pain control. On day of discharge patient was stable to be d/c'd home.

## 2022-11-07 NOTE — DISCHARGE NOTE PROVIDER - NSDCFUADDINST_GEN_ALL_CORE_FT
Please follow up with your Gastroenterologist as indicated    Warning Signs:  Please call your doctor or nurse practitioner if you experience the following:  *You experience new chest pain, pressure, squeezing or tightness.  *New or worsening cough, shortness of breath, or wheeze.  *If you are vomiting and cannot keep down fluids or your medications.  *You are getting dehydrated due to continued vomiting, diarrhea, or other reasons. Signs of dehydration include dry mouth, rapid heartbeat, or feeling dizzy or faint when standing.  *You see blood or dark/black material when you vomit or have a bowel movement.  *You experience burning when you urinate, have blood in your urine, or experience a discharge.  *Your pain is not improving within 8-12 hours or is not gone within 24 hours. Call or return immediately if your pain is getting worse, changes location, or moves to your chest or back.  *You have shaking chills, or fever greater than 101.5 degrees Fahrenheit or 38 degrees Celsius.  *Any change in your symptoms, or any new symptoms that concern you.

## 2022-11-07 NOTE — DISCHARGE NOTE NURSING/CASE MANAGEMENT/SOCIAL WORK - PATIENT PORTAL LINK FT
You can access the FollowMyHealth Patient Portal offered by [facility] by registering at the following website: [email or web address]. By joining [facility]’s FollowMyHealth portal, you will also be able to view your health information using other applications (apps) compatible with our system.

## 2022-11-07 NOTE — DISCHARGE NOTE PROVIDER - CARE PROVIDER_API CALL
Anderson Garcia (MD)  Surgery  [address] [address]  Phone: [phone]  Fax: [phone]  Follow Up Time: Routine

## 2022-11-07 NOTE — DISCHARGE NOTE NURSING/CASE MANAGEMENT/SOCIAL WORK - NSDCPEFALRISK_GEN_ALL_CORE
For information on Fall & Injury Prevention, visit: [email or web address] OR  [email or web address] OR  https://www.cdc.gov/steadi/patient.html

## 2022-11-07 NOTE — PROGRESS NOTE ADULT - SUBJECTIVE AND OBJECTIVE BOX
GASTROENTEROLOGY PROGRESS NOTE  Patient seen and examined at bedside. No further bleeding, feels well otherwise    PERTINENT REVIEW OF SYSTEMS:  CONSTITUTIONAL: No weakness, fevers or chills  HEENT: No visual changes; No vertigo or throat pain   GASTROINTESTINAL: As above.  NEUROLOGICAL: No numbness or weakness  SKIN: No itching, burning, rashes, or lesions     Allergies    Allergy Status Unknown    Intolerances      MEDICATIONS:  MEDICATIONS  (STANDING):  chlorhexidine 4% Liquid 1 Application(s) Topical <User Schedule>  influenza  Vaccine (HIGH DOSE) 0.7 milliLiter(s) IntraMuscular once  magnesium sulfate  IVPB 1 Gram(s) IV Intermittent once  pantoprazole  Injectable 40 milliGRAM(s) IV Push daily  potassium chloride   Powder 40 milliEquivalent(s) Oral once  sodium chloride 0.9% lock flush 3 milliLiter(s) IV Push every 8 hours    MEDICATIONS  (PRN):    Vital Signs Last 24 Hrs  T(C): 36.8 (07 Nov 2022 05:00), Max: 37.7 (06 Nov 2022 18:00)  T(F): 98.2 (07 Nov 2022 05:00), Max: 99.9 (06 Nov 2022 18:00)  HR: 68 (07 Nov 2022 04:15) (63 - 82)  BP: 137/63 (07 Nov 2022 04:15) (114/55 - 156/72)  BP(mean): 90 (07 Nov 2022 04:15) (85 - 103)  RR: 17 (07 Nov 2022 04:15) (13 - 23)  SpO2: 95% (07 Nov 2022 04:15) (95% - 99%)    Parameters below as of 07 Nov 2022 04:15  Patient On (Oxygen Delivery Method): room air        11-06 @ 07:01  -  11-07 @ 07:00  --------------------------------------------------------  IN: 1852.5 mL / OUT: 2500 mL / NET: -647.5 mL      PHYSICAL EXAM:    General: lying in bed, in no acute distress  HEENT: MMM, conjunctiva and sclera clear  Gastrointestinal: Soft non-tender non-distended; No rebound or guarding  Skin: Warm and dry. No obvious rash    LABS:                        7.6    5.98  )-----------( 151      ( 07 Nov 2022 05:30 )             23.2     11-07    143  |  108  |  4<L>  ----------------------------<  129<H>  3.6   |  27  |  0.73    Ca    8.4      07 Nov 2022 05:30  Phos  3.4     11-07  Mg     1.9     11-07                        RADIOLOGY & ADDITIONAL STUDIES:  Reviewed
SUBJECTIVE:   Patient seen and examined on am rounds. No new complaints. -n-v-cp-sob.    Vital Signs Last 24 Hrs  T(C): 36.4 (06 Nov 2022 04:30), Max: 36.8 (05 Nov 2022 22:05)  T(F): 97.6 (06 Nov 2022 04:30), Max: 98.2 (05 Nov 2022 22:05)  HR: 73 (06 Nov 2022 08:00) (55 - 103)  BP: 113/56 (06 Nov 2022 06:00) (99/63 - 145/65)  BP(mean): 81 (06 Nov 2022 06:00) (73 - 94)  RR: 13 (06 Nov 2022 08:00) (12 - 28)  SpO2: 99% (06 Nov 2022 08:00) (96% - 100%)    Parameters below as of 06 Nov 2022 08:00  Patient On (Oxygen Delivery Method): room air        I&O's Summary    05 Nov 2022 08:01  -  06 Nov 2022 07:00  --------------------------------------------------------  IN: 4870 mL / OUT: 1450 mL / NET: 3420 mL    06 Nov 2022 07:01  -  06 Nov 2022 08:21  --------------------------------------------------------  IN: 100 mL / OUT: 0 mL / NET: 100 mL        Physical Exam:  General Appearance: Appears well, NAD  Pulmonary: Nonlabored breathing, no respiratory distress  Cardiovascular: NSR  Abdomen: Soft, nondisteded, nontender.  Extremities: WWP, SCD's in place     LABS:                        7.5    5.15  )-----------( 148      ( 06 Nov 2022 06:13 )             22.5     11-06    144  |  112<H>  |  9   ----------------------------<  115<H>  3.8   |  28  |  0.71    Ca    7.8<L>      06 Nov 2022 06:13  Phos  2.7     11-06  Mg     1.9     11-06    TPro  6.9  /  Alb  4.5  /  TBili  0.2  /  DBili  x   /  AST  20  /  ALT  11  /  AlkPhos  81  11-05    PT/INR - ( 05 Nov 2022 00:35 )   PT: 11.8 sec;   INR: 0.99          PTT - ( 05 Nov 2022 00:35 )  PTT:33.7 sec    
GASTROENTEROLOGY PROGRESS NOTE  Patient seen and examined at bedside. No events overnight, received 1 uprbc, no bm's at all    PERTINENT REVIEW OF SYSTEMS:  CONSTITUTIONAL: No fevers or chills  HEENT: No visual changes; No vertigo or throat pain   GASTROINTESTINAL: As above.  NEUROLOGICAL: No numbness or weakness  SKIN: No itching, burning, rashes, or lesions     Allergies    Allergy Status Unknown    Intolerances      MEDICATIONS:  MEDICATIONS  (STANDING):  chlorhexidine 4% Liquid 1 Application(s) Topical <User Schedule>  dextrose 5%. 1000 milliLiter(s) (50 mL/Hr) IV Continuous <Continuous>  dextrose 5%. 1000 milliLiter(s) (100 mL/Hr) IV Continuous <Continuous>  dextrose 50% Injectable 25 Gram(s) IV Push once  dextrose 50% Injectable 12.5 Gram(s) IV Push once  dextrose 50% Injectable 25 Gram(s) IV Push once  glucagon  Injectable 1 milliGRAM(s) IntraMuscular once  influenza  Vaccine (HIGH DOSE) 0.7 milliLiter(s) IntraMuscular once  insulin lispro (ADMELOG) corrective regimen sliding scale   SubCutaneous every 6 hours  lactated ringers. 1000 milliLiter(s) (100 mL/Hr) IV Continuous <Continuous>  pantoprazole  Injectable 40 milliGRAM(s) IV Push daily    MEDICATIONS  (PRN):  dextrose Oral Gel 15 Gram(s) Oral once PRN Blood Glucose LESS THAN 70 milliGRAM(s)/deciliter    Vital Signs Last 24 Hrs  T(C): 36.8 (06 Nov 2022 09:30), Max: 36.8 (05 Nov 2022 22:05)  T(F): 98.2 (06 Nov 2022 09:30), Max: 98.2 (05 Nov 2022 22:05)  HR: 74 (06 Nov 2022 10:00) (55 - 87)  BP: 122/59 (06 Nov 2022 10:00) (102/67 - 145/65)  BP(mean): 85 (06 Nov 2022 10:00) (73 - 94)  RR: 23 (06 Nov 2022 10:00) (12 - 25)  SpO2: 97% (06 Nov 2022 10:00) (96% - 100%)    Parameters below as of 06 Nov 2022 10:00  Patient On (Oxygen Delivery Method): room air        11-05 @ 08:01  -  11-06 @ 07:00  --------------------------------------------------------  IN: 4870 mL / OUT: 1450 mL / NET: 3420 mL    11-06 @ 07:01  -  11-06 @ 10:57  --------------------------------------------------------  IN: 525 mL / OUT: 600 mL / NET: -75 mL      PHYSICAL EXAM:    General: lying in bed, in no acute distress  HEENT: MMM, conjunctiva and sclera clear  Gastrointestinal: Soft non-tender non-distended; No rebound or guarding  Skin: Warm and dry. No obvious rash    LABS:                        7.5    5.15  )-----------( 148      ( 06 Nov 2022 06:13 )             22.5     11-06    144  |  112<H>  |  9   ----------------------------<  115<H>  3.8   |  28  |  0.71    Ca    7.8<L>      06 Nov 2022 06:13  Phos  2.7     11-06  Mg     1.9     11-06    TPro  6.9  /  Alb  4.5  /  TBili  0.2  /  DBili  x   /  AST  20  /  ALT  11  /  AlkPhos  81  11-05    PT/INR - ( 05 Nov 2022 00:35 )   PT: 11.8 sec;   INR: 0.99          PTT - ( 05 Nov 2022 00:35 )  PTT:33.7 sec                  RADIOLOGY & ADDITIONAL STUDIES:  Reviewed
STATUS POST: Cscope and clipping x 2     SUBJECTIVE: Pt seen and examined at bedside this am by surgery team. Patient is lying comfortably in bed with no complaints. Tolerating diet, pain well controlled with current regimen. Patient denies fever, nausea, vomiting, chest pain, and shortness of breath. Patient is passing gas and having bowel movements. Had 2 nonbloody BMs overnight.     MEDICATIONS  (STANDING):  chlorhexidine 4% Liquid 1 Application(s) Topical <User Schedule>  influenza  Vaccine (HIGH DOSE) 0.7 milliLiter(s) IntraMuscular once  pantoprazole  Injectable 40 milliGRAM(s) IV Push daily  sodium chloride 0.9% lock flush 3 milliLiter(s) IV Push every 8 hours    MEDICATIONS  (PRN):      Vital Signs Last 24 Hrs  T(C): 36.8 (07 Nov 2022 05:00), Max: 37.7 (06 Nov 2022 18:00)  T(F): 98.2 (07 Nov 2022 05:00), Max: 99.9 (06 Nov 2022 18:00)  HR: 68 (07 Nov 2022 04:15) (63 - 82)  BP: 137/63 (07 Nov 2022 04:15) (114/55 - 156/72)  BP(mean): 90 (07 Nov 2022 04:15) (85 - 103)  RR: 17 (07 Nov 2022 04:15) (13 - 23)  SpO2: 95% (07 Nov 2022 04:15) (95% - 99%)    Parameters below as of 07 Nov 2022 04:15  Patient On (Oxygen Delivery Method): room air        Physical Exam  General: Patient is doing well and lying in bed comfortably  Constitutional: alert and oriented   Pulm: Nonlabored breathing, no respiratory distress  CV: Regular rate and rhythm, normal sinus rhythm  Abd:  soft, nontender, nondistended. No rebound, no guarding.   Extremities: warm, well perfused, no edema    I&O's Detail    05 Nov 2022 08:01  -  06 Nov 2022 07:00  --------------------------------------------------------  IN:    Lactated Ringers: 2400 mL    Oral Fluid: 2120 mL    PRBCs (Packed Red Blood Cells): 350 mL  Total IN: 4870 mL    OUT:    Voided (mL): 1450 mL  Total OUT: 1450 mL    Total NET: 3420 mL      06 Nov 2022 07:01  -  07 Nov 2022 06:50  --------------------------------------------------------  IN:    IV PiggyBack: 312.5 mL    Lactated Ringers: 1300 mL    Oral Fluid: 240 mL  Total IN: 1852.5 mL    OUT:    Voided (mL): 2500 mL  Total OUT: 2500 mL    Total NET: -647.5 mL        LABS:                        8.2    7.78  )-----------( 163      ( 06 Nov 2022 15:35 )             25.2     11-06    144  |  112<H>  |  9   ----------------------------<  115<H>  3.8   |  28  |  0.71    Ca    7.8<L>      06 Nov 2022 06:13  Phos  2.7     11-06  Mg     1.9     11-06        
INTERVAL/OVERNIGHT EVENTS: NAEO. Post transfusion Hg 8.8, then 7.6, then 7.5. No more BMs.     SUBJECTIVE: This AM is doing well without pain or N/V. Tolerated clears. Had one nonbloody BM at 11am. Voids without issues. No CP/SOB or lightheadedness.     Gastrointestinal Medications  lactated ringers. 1000 milliLiter(s) IV Continuous <Continuous>  pantoprazole  Injectable 40 milliGRAM(s) IV Push daily    Endocrine/Metabolic Medications  insulin lispro (ADMELOG) corrective regimen sliding scale   SubCutaneous every 6 hours    I&O's Detail    05 Nov 2022 08:01  -  06 Nov 2022 07:00  --------------------------------------------------------  IN:    Lactated Ringers: 2400 mL    Oral Fluid: 2120 mL    PRBCs (Packed Red Blood Cells): 350 mL  Total IN: 4870 mL    OUT:    Voided (mL): 1450 mL  Total OUT: 1450 mL    Total NET: 3420 mL    06 Nov 2022 07:01  -  06 Nov 2022 13:08  --------------------------------------------------------  IN:    IV PiggyBack: 125 mL    Lactated Ringers: 400 mL  Total IN: 525 mL    OUT:    Voided (mL): 600 mL  Total OUT: 600 mL    Total NET: -75 mL    Vital Signs Last 24 Hrs  T(C): 36.8 (06 Nov 2022 09:30), Max: 36.8 (05 Nov 2022 22:05)  T(F): 98.2 (06 Nov 2022 09:30), Max: 98.2 (05 Nov 2022 22:05)  HR: 74 (06 Nov 2022 10:00) (55 - 87)  BP: 122/59 (06 Nov 2022 10:00) (103/51 - 145/65)  BP(mean): 85 (06 Nov 2022 10:00) (73 - 94)  RR: 23 (06 Nov 2022 10:00) (12 - 25)  SpO2: 97% (06 Nov 2022 10:00) (96% - 100%)    Parameters below as of 06 Nov 2022 10:00  Patient On (Oxygen Delivery Method): room air    GENERAL: NAD, resting comfortably in chair, pleasant  HEENT: NCAT, MMM  C/V: Normal rate, normal peripheral perfusion, no murmurs  PULM: Nonlabored breathing, no respiratory distress, CTA b/l  ABD: Soft, ND, NT, no rebound tenderness, no guarding  EXTREM: WWP, no edema, SCDs in place  NEURO: No focal deficits    LABS:                        7.5    5.15  )-----------( 148      ( 06 Nov 2022 06:13 )             22.5     11-06    144  |  112<H>  |  9   ----------------------------<  115<H>  3.8   |  28  |  0.71    Ca    7.8<L>      06 Nov 2022 06:13  Phos  2.7     11-06  Mg     1.9     11-06    TPro  6.9  /  Alb  4.5  /  TBili  0.2  /  DBili  x   /  AST  20  /  ALT  11  /  AlkPhos  81  11-05    PT/INR - ( 05 Nov 2022 00:35 )   PT: 11.8 sec;   INR: 0.99       PTT - ( 05 Nov 2022 00:35 )  PTT:33.7 sec

## 2022-11-07 NOTE — PROGRESS NOTE ADULT - ASSESSMENT
68 yo F, underwent screening colonoscopy with 3 polypectomies 11/4/22, pw BRBPR,     GI was consulted for GI bleeding    Colonoscopy 11/5/2022: Diverticulosis, cecal polypectomy site with ulcer / blood clot, hemo-clipped x2    #BRBPR  #Acute blood loss anemia, in setting of recent polypectomy    Recommendations:  Consider IV Iron  I discussed her care yesterday with her outpatient GI office covering physician    Thank you for allowing us to participate in the care of this patient. GI will sign off the case.  Please call us if you have any further questions or concerns    Prabhjot Garza M.D.  Gastroenterology Fellow  Weekday Pager: [phone]  Weeknights/Weekend Coverage: Please Call the  for contact info
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
## 2022-11-14 DIAGNOSIS — Z86.010 PERSONAL HISTORY OF COLONIC POLYPS: ICD-10-CM

## 2022-11-14 DIAGNOSIS — K92.1 MELENA: ICD-10-CM

## 2022-11-14 DIAGNOSIS — D62 ACUTE POSTHEMORRHAGIC ANEMIA: ICD-10-CM

## 2022-11-14 DIAGNOSIS — K57.30 DIVERTICULOSIS OF LARGE INTESTINE WITHOUT PERFORATION OR ABSCESS WITHOUT BLEEDING: ICD-10-CM

## 2022-11-14 DIAGNOSIS — K91.840 POSTPROCEDURAL HEMORRHAGE OF A DIGESTIVE SYSTEM ORGAN OR STRUCTURE FOLLOWING A DIGESTIVE SYSTEM PROCEDURE: ICD-10-CM
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
## 2023-04-13 NOTE — PATIENT PROFILE ADULT - CAREGIVER PHONE NUMBER
[[email or web address]] : recurrent Keloid evaluation \par DOP: 07/16/2019 s/p excision and reconstruction of Left earlobe keloid. [phone]